# Patient Record
Sex: FEMALE
[De-identification: names, ages, dates, MRNs, and addresses within clinical notes are randomized per-mention and may not be internally consistent; named-entity substitution may affect disease eponyms.]

---

## 2018-06-06 ENCOUNTER — HOSPITAL ENCOUNTER (EMERGENCY)
Dept: HOSPITAL 31 - C.ER | Age: 38
Discharge: HOME | End: 2018-06-06
Payer: COMMERCIAL

## 2018-06-06 VITALS — DIASTOLIC BLOOD PRESSURE: 74 MMHG | TEMPERATURE: 98 F | HEART RATE: 76 BPM | SYSTOLIC BLOOD PRESSURE: 126 MMHG

## 2018-06-06 VITALS — OXYGEN SATURATION: 100 %

## 2018-06-06 VITALS — RESPIRATION RATE: 18 BRPM

## 2018-06-06 DIAGNOSIS — L50.0: Primary | ICD-10-CM

## 2018-06-06 NOTE — C.PDOC
History Of Present Illness


36yo female, presents to ED for evaluation of a diffuse rash to her body since 

4 days ago. Patient states she used a new lotion 4 days ago and applied it on 

her legs and dabbed in on her arm; she states wherever she put the lotion, she 

subsequently noted red bumps to those areas. She denies any itching, throat 

swelling, lip swelling, difficulty breathing. No other complaints.





PMD: David Casillas


Time Seen by Provider: 06/06/18 07:56


Chief Complaint (Nursing): Allergic Reaction


History Per: Patient


History/Exam Limitations: no limitations


Onset/Duration Of Symptoms: Days


Current Symptoms Are (Timing): Still Present


Possible Cause: Other (lotion)


Associated Symptoms: Skin Rash, Redness.  denies: Swelling, Trouble Swallowing, 

Itching


Severity: Mild


Recent travel outside of the [place]: No


Additional History Per: Patient





Past Medical History


Reviewed: Historical Data, Nursing Documentation, Vital Signs


Vital Signs: 


 Last Vital Signs











Temp  98 F   06/06/18 08:57


 


Pulse  76   06/06/18 08:57


 


Resp  18   06/06/18 08:57


 


BP  126/74   06/06/18 08:57


 


Pulse Ox  98   06/06/18 08:57














- Medical History


PMH: No Chronic Diseases


Surgical History: No Surg Hx


Family History: States: No Known Family Hx





- Social History


Hx Alcohol Use: Yes


Hx Substance Use: No





Review Of Systems


Except As Marked, All Systems Reviewed And Found Negative.


Constitutional: Negative for: Fever, Chills


ENT: Negative for: Mouth Swelling, Throat Swelling


Respiratory: Negative for: Shortness of Breath


Gastrointestinal: Negative for: Nausea, Vomiting


Skin: Positive for: Rash





Physical Exam





- Physical Exam


Appears: Non-toxic, No Acute Distress


Skin: Warm, Dry, Rash (diffuse erythematous maculopapular rash to legs and 

arms. )


Head: Atraumatic, Normacephalic


Eye(s): bilateral: Normal Inspection


Oral Mucosa: Moist


Neck: Normal ROM, Supple


Chest: Symmetrical


Cardiovascular: Rhythm Regular


Respiratory: Normal Breath Sounds, No Wheezing


Extremity: Normal ROM, No Pedal Edema


Neurological/Psych: Oriented x3


Gait: Steady





ED Course And Treatment


O2 Sat by Pulse Oximetry: 100 (RA)


Pulse Ox Interpretation: Normal


Progress Note: Patient treated with prednisone PO.  on re-evaluation in no 

distress.  discharged to home


Reassessment Condition: Improved





Medical Decision Making


Medical Decision Making: 


Plan:


-- Prednisone 60mg PO





Progress:


0845


Patient reports feeling much better. Instructed to take medications as 

prescribed and to follow up with PMD in 2-3 days. Stable for discharge home.





Disposition


Counseled Patient/Family Regarding: Studies Performed, Diagnosis, Need For 

Followup, Rx Given





- Disposition


Referrals: 


[facility] [Outside]


[facility] [Outside]


Disposition: HOME/ ROUTINE


Disposition Time: 09:00


Condition: GOOD


Additional Instructions: 


Follow up with PMD or clinic for further evaluation


Prescriptions: 


Methylprednisolone [Medrol Dose Pack (21 tabs)] 4 mg PO DAILY #21 mg


Instructions:  Hives, Skin Rash


Forms:  [facility] (English)





- POA


Present On Arrival: None





- Clinical Impression


Clinical Impression: 


 Allergic urticaria








- PA / NP / Resident Statement


MD/DO has reviewed & agrees with the documentation as recorded.





- Scribe Statement


The provider has reviewed the documentation as recorded by the Scribe (Perla Tong)


Provider Attestation:


All medical record entries made by the Scribe were at my direction and 

personally dictated by me. I have reviewed the chart and agree that the record 

accurately reflects my personal performance of the history, physical exam, 

medical decision making, and the department course for this patient. I have 

also personally directed, reviewed, and agree with the discharge instructions 

and disposition.